# Patient Record
Sex: FEMALE | Race: WHITE
[De-identification: names, ages, dates, MRNs, and addresses within clinical notes are randomized per-mention and may not be internally consistent; named-entity substitution may affect disease eponyms.]

---

## 2017-10-23 ENCOUNTER — HOSPITAL ENCOUNTER (EMERGENCY)
Dept: HOSPITAL 61 - ER | Age: 47
Discharge: HOME | End: 2017-10-23
Payer: COMMERCIAL

## 2017-10-23 VITALS — WEIGHT: 293 LBS | HEIGHT: 65 IN | BODY MASS INDEX: 48.82 KG/M2

## 2017-10-23 VITALS — DIASTOLIC BLOOD PRESSURE: 82 MMHG | SYSTOLIC BLOOD PRESSURE: 151 MMHG

## 2017-10-23 DIAGNOSIS — F17.210: ICD-10-CM

## 2017-10-23 DIAGNOSIS — L03.211: Primary | ICD-10-CM

## 2017-10-23 PROCEDURE — 99283 EMERGENCY DEPT VISIT LOW MDM: CPT

## 2017-10-23 NOTE — PHYS DOC
Past Medical History


Past Medical History:  No Pertinent History


Past Surgical History:  Appendectomy, Cholecystectomy, , Gastric Bypass

, Hysterectomy, Tonsillectomy, Other


Additional Past Surgical Histo:  PARTIAL HYSTERECTOMY, COMPOUND FX REPAIR R ARM

, GURDLE STONE REMOVAL,


Smoking:  Cigarettes, Greater than 1 pack/day


Alcohol Use:  None


Drug Use:  None





Adult General


Chief Complaint


Chief Complaint:  INSECT BITE





HPI


HPI





She is a pleasant 47-year-old female who last evening found a bug stuck on her 

chin and she ripped it from the source. In the morning she awoke with a larger 

area of erythema localized soft tissue swelling without fever, neck pain, 

lymphadenopathy, headache, joint pain, rash or other symptoms. Patient does not 

know if it was a tick but is concerned that there is retained tick head 

somewhere in the wound. She denies any chest pain, shortness of breath or other 

symptoms.





Review of Systems


Review of Systems





Constitutional: Denies fever or chills []


Eyes: Denies change in visual acuity, redness, or eye pain []


HENT: Denies nasal congestion or sore throat []


Respiratory: Denies cough or shortness of breath []


Cardiovascular: No additional information not addressed in HPI []


GI: Denies abdominal pain, nausea, vomiting, bloody stools or diarrhea []


: Denies dysuria or hematuria []


Musculoskeletal: Denies back pain or joint pain []


Integument: She complains of skin redness and swelling to the localized refill 

chin on the left.


Neurologic: Denies headache, focal weakness or sensory changes []


Endocrine: Denies polyuria or polydipsia []





Physical Exam


Physical Exam


Vital signs recorded on the chart within normal limits


Constitutional: Well developed, well nourished, no acute distress, non-toxic 

appearance. []


Neck: Normal range of motion, no tenderness, supple, no stridor. All area. 

Erythema of the local contusion measuring 1.2 cm x 1.5 cm with some induration 

and soft tissue swelling. There is no fluctuance no clear signs of an abscess. 

Patient has no foreign body noted within the soft tissue as well. No clear 

signs of tick retained foreign body


Cardiovascular:Heart rate regular rhythm, no murmur []


Lungs & Thorax:  Bilateral breath sounds clear to auscultation [] 


Skin: Warm, dry, no erythema, no rash. [] 


Extremities: No tenderness, no cyanosis, no clubbing, ROM intact, no edema. [] 


Neurologic: Alert and oriented X 3, normal motor function, normal sensory 

function, no focal deficits noted. []





Current Patient Data


Vital Signs





 Vital Signs








  Date Time  Temp Pulse Resp B/P (MAP) Pulse Ox O2 Delivery O2 Flow Rate FiO2


 


10/23/17 11:30 97.8 72 20  98 Room Air  





 97.8       











EKG


EKG


[]





Radiology/Procedures


Radiology/Procedures


[]





Course & Med Decision Making


Course & Med Decision Making


Pertinent Labs and Imaging studies reviewed. (See chart for details) she has no 

area of erythema and induration which may represent an early cellulitis versus 

a histamine response to local envenomation. The patient does not clearly 

understand which Of insect this and that immediate her we treat this as a 

presumptive cellulitis with very close follow-up.[]





Dragon Disclaimer


Dragon Disclaimer


This electronic medical record was generated, in whole or in part, using a 

voice recognition dictation system.





Departure


Departure


Impression:  


 Primary Impression:  


 Cellulitis


Disposition:  01 HOME, SELF-CARE


Condition:  IMPROVED


Referrals:  


MIGEL LOYA (PCP)


Patient Instructions:  Cellulitis





Additional Instructions:  


My discharge plan








Follow up: In addition patient is asked to followup with their primary doctor, 

  within a week for followup examination and to address patient's ongoing 

medical conditions. Follow-up in 24-48 hours repeat wound care to ensure that 

this wound is not getting worse.





Patient is advised that in the Emergency Department primary complaints are 

addressed and only in light of known signs and symptoms.  Patient should return 

immediately to the emergency department if new signs and symptoms develop or 

patient's condition worsens in any way.  At time of discharge patient was in 

stable condition and had verbalized understanding of the discharge instructions.


Scripts


Sulfamethoxazole/Trimethoprim (BACTRIM DS TABLET) 1 Each Tablet


1 TAB PO BID, #20 TAB


   Prov: ELDA MICHEL MD         10/23/17 


Ibuprofen (IBUPROFEN) 400 Mg Tablet


400 MG PO PRN Q6HRS Y for INFLAMMATION for 10 Days, TAB


   Prov: ELDA MICHEL MD         10/23/17











ELDA MIHCEL MD Oct 23, 2017 12:10

## 2017-12-15 ENCOUNTER — HOSPITAL ENCOUNTER (EMERGENCY)
Dept: HOSPITAL 61 - ER | Age: 47
LOS: 1 days | Discharge: HOME | End: 2017-12-16
Payer: COMMERCIAL

## 2017-12-15 VITALS — DIASTOLIC BLOOD PRESSURE: 85 MMHG | SYSTOLIC BLOOD PRESSURE: 137 MMHG

## 2017-12-15 VITALS — HEIGHT: 65 IN | WEIGHT: 290 LBS | BODY MASS INDEX: 48.32 KG/M2
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
## 2017-12-15 DIAGNOSIS — E66.9: ICD-10-CM

## 2017-12-15 DIAGNOSIS — F17.210: ICD-10-CM

## 2017-12-15 DIAGNOSIS — S70.11XA: ICD-10-CM

## 2017-12-15 DIAGNOSIS — M19.90: ICD-10-CM

## 2017-12-15 DIAGNOSIS — S70.01XA: ICD-10-CM

## 2017-12-15 DIAGNOSIS — Z90.711: ICD-10-CM

## 2017-12-15 DIAGNOSIS — Z90.49: ICD-10-CM

## 2017-12-15 DIAGNOSIS — Z98.84: ICD-10-CM
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
## 2017-12-15 DIAGNOSIS — S63.501A: Primary | ICD-10-CM
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
## 2017-12-15 PROCEDURE — 96372 THER/PROPH/DIAG INJ SC/IM: CPT

## 2017-12-15 PROCEDURE — 73110 X-RAY EXAM OF WRIST: CPT

## 2017-12-15 PROCEDURE — 99284 EMERGENCY DEPT VISIT MOD MDM: CPT

## 2017-12-15 PROCEDURE — 73502 X-RAY EXAM HIP UNI 2-3 VIEWS: CPT
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
## 2017-12-16 NOTE — RAD
AP pelvis and 2 views right hip 12/15/2017



Clinical indication: Right hip pain status post fall.



Comparison: None.



Findings: Prior right acetabular reconstruction using malleable plate and

screw. Prior Girdlestone procedure with resection of the femoral head and

disarticulation of the proximal femur which is superiorly migrated relative to

the orthotopic position. Lower lumbar spondylosis. No evidence of acute bony

pelvic fracture or dislocation. There is heterotopic ossification speared to

the acetabular fossa.



Impression:

1. Postsurgical changes of a prior Girdlestone procedure with superior

subluxation of the proximal femur.

2. No radiographic evidence of acute bony pelvic fracture.

## 2017-12-16 NOTE — RAD
3 views right wrist radiograph 12/15/2017



Clinical indication: Right wrist pain status post fall.



Comparison: Right wrist 12/30/2009



Findings: Plate and screw fixation of the distal ulna with hardware intact.

Normal bony alignment. No evidence of acute fracture or dislocation. There is

mild ulnar minus variance measuring 4 mm.



Impression:

1. Prior internal fixation distal ulna with intact orthopedic hardware.

2. No evidence of acute fracture or traumatic malalignment.

3. Mild, 4 mm, ulnar minus variance.

## 2018-03-25 ENCOUNTER — HOSPITAL ENCOUNTER (EMERGENCY)
Dept: HOSPITAL 61 - ER | Age: 48
Discharge: HOME | End: 2018-03-25
Payer: COMMERCIAL

## 2018-03-25 DIAGNOSIS — Z98.84: ICD-10-CM

## 2018-03-25 DIAGNOSIS — Z96.641: ICD-10-CM

## 2018-03-25 DIAGNOSIS — M25.551: Primary | ICD-10-CM

## 2018-03-25 DIAGNOSIS — Z90.49: ICD-10-CM

## 2018-03-25 DIAGNOSIS — G89.29: ICD-10-CM

## 2018-03-25 DIAGNOSIS — Z90.711: ICD-10-CM

## 2018-03-25 PROCEDURE — 99283 EMERGENCY DEPT VISIT LOW MDM: CPT

## 2018-03-25 RX ADMIN — OXYCODONE HYDROCHLORIDE AND ACETAMINOPHEN 1 TAB: 5; 325 TABLET ORAL at 22:20

## 2018-07-10 ENCOUNTER — HOSPITAL ENCOUNTER (EMERGENCY)
Dept: HOSPITAL 61 - ER | Age: 48
Discharge: HOME | End: 2018-07-10
Payer: COMMERCIAL

## 2018-07-10 DIAGNOSIS — H66.91: Primary | ICD-10-CM

## 2018-07-10 DIAGNOSIS — J02.9: ICD-10-CM

## 2018-07-10 PROCEDURE — 99283 EMERGENCY DEPT VISIT LOW MDM: CPT

## 2018-07-10 RX ADMIN — OXYCODONE HYDROCHLORIDE AND ACETAMINOPHEN 1 TAB: 5; 325 TABLET ORAL at 21:23

## 2018-10-10 ENCOUNTER — HOSPITAL ENCOUNTER (EMERGENCY)
Dept: HOSPITAL 61 - ER | Age: 48
Discharge: HOME | End: 2018-10-10
Payer: MEDICARE

## 2018-10-10 VITALS — WEIGHT: 230 LBS | HEIGHT: 66 IN | BODY MASS INDEX: 36.96 KG/M2

## 2018-10-10 VITALS — SYSTOLIC BLOOD PRESSURE: 125 MMHG | DIASTOLIC BLOOD PRESSURE: 63 MMHG

## 2018-10-10 DIAGNOSIS — Z98.890: ICD-10-CM

## 2018-10-10 DIAGNOSIS — S70.01XA: Primary | ICD-10-CM

## 2018-10-10 DIAGNOSIS — Z90.710: ICD-10-CM
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
## 2018-10-10 DIAGNOSIS — Z90.89: ICD-10-CM
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
## 2018-10-10 DIAGNOSIS — F17.200: ICD-10-CM

## 2018-10-10 DIAGNOSIS — Z90.49: ICD-10-CM
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
## 2018-10-10 DIAGNOSIS — G89.29: ICD-10-CM

## 2018-10-10 PROCEDURE — 96372 THER/PROPH/DIAG INJ SC/IM: CPT

## 2018-10-10 PROCEDURE — 99284 EMERGENCY DEPT VISIT MOD MDM: CPT

## 2018-10-10 PROCEDURE — 73502 X-RAY EXAM HIP UNI 2-3 VIEWS: CPT

## 2018-10-10 NOTE — RAD
Portable pelvis and two-view right hip:

 

Reason for examination: Fell with hip pain.

 

Comparison is made to previous study dated 12/15/2017.

 

There are postop changes in the right pelvis which are unchanged. No acute

pelvic fracture is seen. The bone density is normal. No abnormality seen 

at the sacrum or sacroiliac joints. No abnormality seen at the proximal 

left femur. The right femoral head and neck are absent and there is 

elevation of the femur superiorly from the acetabulum. This however is 

unchanged.

 

2 views of the right hip again show absence of the right femoral head and 

neck with elevation of the distal femur above the expected position of the

acetabulum. These findings again however are unchanged.

 

IMPRESSION:

 

Chronic changes in the right pelvis and hip with absence of the right 

femoral head and neck and elevation of the distal femur superior to the 

expected position of the acetabulum. No interval change is evident. No 

acute abnormality seen.

 

Electronically signed by: Zenaida Rosa MD (10/10/2018 3:34 AM) [facility]

## 2018-10-10 NOTE — PHYS DOC
Past Medical History


Past Medical History:  Ovarian Cyst


Past Surgical History:  Appendectomy, Cholecystectomy, , Gastric Bypass

, Hysterectomy, Tonsillectomy, Other


Additional Past Surgical Histo:  PARTIAL HYSTERECTOMY, COMPOUND FX REPAIR R ARM

, GURDLE STONE REMOVAL, rny


Additional Information:  


0.5 ppd


Alcohol Use:  None


Drug Use:  None





Adult General


Chief Complaint


Chief Complaint:  HIP PAIN





HPI


HPI





Patient is a 48-year-old female who presents with complaint of severe right hip 

pain. Patient had been walking and had fallen onto her hip. Patient indicates 

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 

Patient is supposed to undergo hip replacement surgery in the future but has 

been unable to do so due to her weight. Patient states that she had lost 

balance causing her to fall. Patient rates her pain to be a 10 out of 10 and 

states the pain is worsened with any movement about her hip.





Review of Systems


Review of Systems





Constitutional: Denies fever or chills []


Respiratory: Denies cough or shortness of breath []


Cardiovascular: Denies chest pain[]


Musculoskeletal: Complains of right hip pain[]


Integument: Denies rash or skin lesions []





All other systems were reviewed and found to be within normal limits, except as 

documented in this note.





Current Medications


Current Medications





Current Medications








 Medications


  (Trade)  Dose


 Ordered  Sig/[facility]  Start Time


 Stop Time Status Last Admin


Dose Admin


 


 Ketorolac


 Tromethamine


  (Toradol Im)  60 mg  1X  ONCE  10/10/18 01:15


 10/10/18 01:16 DC 10/10/18 01:16


60 MG











Allergies


Allergies





Allergies








Coded Allergies Type Severity Reaction Last Updated Verified


 


  No Known Drug Allergies    12/15/17 No











Physical Exam


Physical Exam





Constitutional: Well developed, well nourished, in mild distress due to pain. []


HENT: Normocephalic, atraumatic, bilateral external ears normal. []


Neck: Normal range of motion, no tenderness, supple, no stridor. [] 


Cardiovascular:Heart rate regular rhythm, no murmur []


Lungs & Thorax:  Bilateral breath sounds clear to auscultation []


Skin: Warm, dry, no erythema, no rash. [] 


Extremities: Right hip demonstrates no external signs of acute trauma. Patient 

reports significant tenderness over hip region and states that she is unable to 

tolerate any range of motion testing due to pain. [] 


Neurologic: Alert and oriented X 3, normal motor function, normal sensory 

function, no focal deficits noted. []





Current Patient Data


Vital Signs





 Vital Signs








  Date Time  Temp Pulse Resp B/P (MAP) Pulse Ox O2 Delivery O2 Flow Rate FiO2


 


10/10/18 01:20  63 34 125/63 (83) 100 Room Air  


 


10/10/18 00:55 98.7       





 98.7       











EKG


EKG


[]





Radiology/Procedures


Radiology/Procedures


[]


Impressions:


X-ray of right hip demonstrates no acute bony abnormalities.





Course & Med Decision Making


Course & Med Decision Making


Pertinent Labs and Imaging studies reviewed. (See chart for details)





[]





Dragon Disclaimer


Dragon Disclaimer


This electronic medical record was generated, in whole or in part, using a 

voice recognition dictation system.





Departure


Departure


Impression:  


 Primary Impression:  


 Contusion of hip, right


 Additional Impression:  


 Chronic right hip pain


Disposition:   HOME, SELF-CARE


Condition:  STABLE


Referrals:  


UNKNOWN PCP NAME (PCP)


Patient Instructions:  Chronic Pain, Contusion


Scripts


Hydrocodone/Apap 5-325 (NORCO 5-325 TABLET) 1 Each Tablet


1 EACH PO PRN Q6HRS PRN for PAIN, #10


   as needed for pain


   Prov: CALISTA LAGUNA Jr. DO         10/10/18





Problem Qualifiers








 Primary Impression:  


 Contusion of hip, right


 Encounter type:  initial encounter  Qualified Codes:  S70.01XA - Contusion of 

right hip, initial encounter








CALISTA LAGUNA Jr. DO Oct 10, 2018 03:03

## 2019-11-13 ENCOUNTER — HOSPITAL ENCOUNTER (EMERGENCY)
Dept: HOSPITAL 61 - ER | Age: 49
Discharge: HOME | End: 2019-11-13
Payer: MEDICARE

## 2019-11-13 VITALS — BODY MASS INDEX: 32.3 KG/M2 | HEIGHT: 66 IN | WEIGHT: 201 LBS

## 2019-11-13 VITALS — SYSTOLIC BLOOD PRESSURE: 89 MMHG | DIASTOLIC BLOOD PRESSURE: 52 MMHG
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
## 2019-11-13 DIAGNOSIS — Z98.84: ICD-10-CM
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
## 2019-11-13 DIAGNOSIS — S61.012A: Primary | ICD-10-CM

## 2019-11-13 PROCEDURE — 12002 RPR S/N/AX/GEN/TRNK2.6-7.5CM: CPT

## 2019-11-13 PROCEDURE — 99283 EMERGENCY DEPT VISIT LOW MDM: CPT

## 2019-11-13 NOTE — PHYS DOC
Past Medical History


Past Medical History:  Ovarian Cyst


Past Surgical History:  Appendectomy, Cholecystectomy, , Gastric 

Bypass, Hysterectomy, Tonsillectomy, Other


Additional Past Surgical Histo:  PARTIAL HYSTERECTOMY, COMPOUND FX REPAIR R ARM,

GURDLE STONE REMOVAL, rny


Alcohol Use:  None


Drug Use:  None





Adult General


Chief Complaint


Chief Complaint:  LACERATION/AVULSION





HPI


HPI





Patient is a 49  year old female who presents to the ED today with left hand 

laceration, patient states she was cut opening a vegetable can. She is right-

handed.





Review of Systems


Review of Systems





Constitutional: Denies fever or chills []


Musculoskeletal: Denies back pain or joint pain []


Integument: Reports left hand laceration


Neurologic: Denies headache, focal weakness or sensory changes []








All other systems were reviewed and found to be within normal limits, except as 

documented in this note.





Current Medications


Current Medications





Current Medications








 Medications


  (Trade)  Dose


 Ordered  Sig/Ursula  Start Time


 Stop Time Status Last Admin


Dose Admin


 


 Diphtheria/


 Tetanus/Acell


 Pertussis


  (Boostrix)  0.5 ml  ONCE ONCE  19 18:30


 19 18:31 DC  





 


 Lidocaine HCl


  (Buffered


 Lidocaine 1%)  3 ml  1X  ONCE  19 18:00


 19 18:01 DC 19 18:00


3 ML











Allergies


Allergies





Allergies








Coded Allergies Type Severity Reaction Last Updated Verified


 


  No Known Drug Allergies    12/15/17 No











Physical Exam


Physical Exam





Constitutional: Well developed, well nourished, no acute distress, non-toxic 

appearance. []


Skin: Webspace of the left thumb and index finger with a laceration 

approximately 3 cm long, there is no obvious tendon involvement. SHEENT able to 

flex and extend the finger with no difficulties. Adequate radius sensation to 

the left thumb. +2 left radial pulse. Cap refill less than 2 seconds the left 

thumb.


Back: No tenderness, no CVA tenderness. [] 


Extremities: No tenderness, no cyanosis, no clubbing, ROM intact, no edema. [] 


Neurologic: Alert and oriented X 3, normal motor function, normal sensory 

function, no focal deficits noted. []


Psychologic: Affect normal, judgement normal, mood normal. []





Current Patient Data


Vital Signs





                                   Vital Signs








  Date Time  Temp Pulse Resp B/P (MAP) Pulse Ox O2 Delivery O2 Flow Rate FiO2


 


11/13/19 18:02 97.7 73 16 89/52 (64) 98 Room Air  





 97.7       











EKG


EKG


[]





Radiology/Procedures


Radiology/Procedures


Laceration/Wound Repair


[]


   Wound Location: Left thumb


   Wound's Depth, Shape: Horizontal


   Wound Length (cm): 3


   Wound Explored:  clean


   Irrigated w/ Saline (ccs):  30


   Betadine Prep?:  Y


   Anesthesia: 1% buffered lidocaine


   Volume Anesthetic (ccs):  2 


   Wound Repaired With: Vicryl


   Suture Size/Type: 4.0/interrupted sutures


   Number of Sutures: 7


Progress  : Wound was covered with nonstick dressing





Course & Med Decision Making


Course & Med Decision Making


Pertinent Labs and Imaging studies reviewed. (See chart for details)





This is a 49-year-old female patient with left hand laceration that was closed 

by me as noted in procedures. Wound care instructions and return precautions 

provided. Tetanus up-to-date.





Dragon Disclaimer


Dragon Disclaimer


This electronic medical record was generated, in whole or in part, using a voice

 recognition dictation system.





Departure


Departure


Impression:  


   Primary Impression:  


   Laceration of left hand


Disposition:   HOME, SELF-CARE


Condition:  STABLE


Referrals:  


NO PCP (PCP)


follow up with your doctor as needed


Patient Instructions:  Laceration Care, Adult, Easy-to-Read





Additional Instructions:  


You have left hand laceration that was closed with dissolvable stitches, they 

will fall off on their own. Keep the area clean and dry. You can shower and wash

 her hands but do not soak in water, apply Neosporin to the area twice a day. 

Monitor the area for any signs of infection including but not limited to 

increased redness, yellow drainage from the ED if they occur.





Problem Qualifiers








   Primary Impression:  


   Laceration of left hand


   Encounter type:  initial encounter  Foreign body presence:  without foreign 

   body  Qualified Codes:  S61.412A - Laceration without foreign body of left 

   hand, initial encounter








ANAKVNG APRN              2019 18:40